# Patient Record
Sex: MALE | Race: WHITE | NOT HISPANIC OR LATINO | URBAN - METROPOLITAN AREA
[De-identification: names, ages, dates, MRNs, and addresses within clinical notes are randomized per-mention and may not be internally consistent; named-entity substitution may affect disease eponyms.]

---

## 2017-01-18 ENCOUNTER — INPATIENT (INPATIENT)
Facility: HOSPITAL | Age: 32
LOS: 1 days | Discharge: LEFT BEFORE DISCHARGE | End: 2017-01-20
Payer: COMMERCIAL

## 2017-01-18 ENCOUNTER — OUTPATIENT (OUTPATIENT)
Dept: OUTPATIENT SERVICES | Facility: HOSPITAL | Age: 32
LOS: 1 days | End: 2017-01-18

## 2017-01-18 PROCEDURE — 71020: CPT | Mod: 26

## 2017-01-18 PROCEDURE — 99291 CRITICAL CARE FIRST HOUR: CPT

## 2017-01-18 PROCEDURE — 70450 CT HEAD/BRAIN W/O DYE: CPT | Mod: 26

## 2017-01-18 PROCEDURE — 99292 CRITICAL CARE ADDL 30 MIN: CPT

## 2017-01-19 ENCOUNTER — OUTPATIENT (OUTPATIENT)
Dept: OUTPATIENT SERVICES | Facility: HOSPITAL | Age: 32
LOS: 1 days | End: 2017-01-19

## 2017-01-19 PROCEDURE — 72158 MRI LUMBAR SPINE W/O & W/DYE: CPT | Mod: 26

## 2017-01-20 ENCOUNTER — OUTPATIENT (OUTPATIENT)
Dept: OUTPATIENT SERVICES | Facility: HOSPITAL | Age: 32
LOS: 1 days | End: 2017-01-20

## 2019-06-17 ENCOUNTER — EMERGENCY (EMERGENCY)
Facility: HOSPITAL | Age: 34
LOS: 1 days | End: 2019-06-17
Admitting: EMERGENCY MEDICINE
Payer: COMMERCIAL

## 2019-06-17 PROCEDURE — 12001 RPR S/N/AX/GEN/TRNK 2.5CM/<: CPT

## 2019-06-17 PROCEDURE — 73130 X-RAY EXAM OF HAND: CPT | Mod: 26,RT

## 2019-06-17 PROCEDURE — 99283 EMERGENCY DEPT VISIT LOW MDM: CPT | Mod: 25

## 2019-07-06 ENCOUNTER — EMERGENCY (EMERGENCY)
Facility: HOSPITAL | Age: 34
LOS: 1 days | End: 2019-07-06
Admitting: EMERGENCY MEDICINE
Payer: COMMERCIAL

## 2019-07-06 PROCEDURE — 70450 CT HEAD/BRAIN W/O DYE: CPT | Mod: 26

## 2019-07-06 PROCEDURE — 99284 EMERGENCY DEPT VISIT MOD MDM: CPT

## 2019-07-09 PROCEDURE — 93010 ELECTROCARDIOGRAM REPORT: CPT

## 2021-06-17 ENCOUNTER — INPATIENT (INPATIENT)
Facility: HOSPITAL | Age: 36
LOS: 1 days | Discharge: HOME | End: 2021-06-19
Payer: COMMERCIAL

## 2021-06-17 PROCEDURE — 73562 X-RAY EXAM OF KNEE 3: CPT | Mod: 26,LT

## 2021-06-17 PROCEDURE — 73590 X-RAY EXAM OF LOWER LEG: CPT | Mod: 26,LT

## 2021-06-17 PROCEDURE — 93010 ELECTROCARDIOGRAM REPORT: CPT

## 2021-06-17 PROCEDURE — 72170 X-RAY EXAM OF PELVIS: CPT | Mod: 26

## 2021-06-17 PROCEDURE — 73706 CT ANGIO LWR EXTR W/O&W/DYE: CPT | Mod: 26,50

## 2021-06-17 PROCEDURE — 73630 X-RAY EXAM OF FOOT: CPT | Mod: 26,LT

## 2021-06-17 PROCEDURE — 71045 X-RAY EXAM CHEST 1 VIEW: CPT | Mod: 26

## 2021-06-17 PROCEDURE — 99285 EMERGENCY DEPT VISIT HI MDM: CPT

## 2021-06-17 PROCEDURE — 73610 X-RAY EXAM OF ANKLE: CPT | Mod: 26,LT

## 2021-06-29 ENCOUNTER — APPOINTMENT (OUTPATIENT)
Dept: PLASTIC SURGERY | Facility: CLINIC | Age: 36
End: 2021-06-29
Payer: COMMERCIAL

## 2021-06-29 VITALS
SYSTOLIC BLOOD PRESSURE: 140 MMHG | TEMPERATURE: 97 F | DIASTOLIC BLOOD PRESSURE: 86 MMHG | RESPIRATION RATE: 18 BRPM | HEIGHT: 70 IN | HEART RATE: 94 BPM | WEIGHT: 232 LBS | BODY MASS INDEX: 33.21 KG/M2 | OXYGEN SATURATION: 99 %

## 2021-06-29 DIAGNOSIS — Z72.89 OTHER PROBLEMS RELATED TO LIFESTYLE: ICD-10-CM

## 2021-06-29 DIAGNOSIS — Z87.891 PERSONAL HISTORY OF NICOTINE DEPENDENCE: ICD-10-CM

## 2021-06-29 DIAGNOSIS — Z87.820 PERSONAL HISTORY OF TRAUMATIC BRAIN INJURY: ICD-10-CM

## 2021-06-29 PROCEDURE — 99072 ADDL SUPL MATRL&STAF TM PHE: CPT

## 2021-06-29 PROCEDURE — 11042 DBRDMT SUBQ TIS 1ST 20SQCM/<: CPT | Mod: 59

## 2021-06-29 PROCEDURE — 99242 OFF/OP CONSLTJ NEW/EST SF 20: CPT | Mod: 25

## 2021-06-29 PROCEDURE — 27603 DRAIN LOWER LEG LESION: CPT

## 2021-06-29 RX ORDER — ALPRAZOLAM 1 MG/1
1 TABLET ORAL
Qty: 90 | Refills: 0 | Status: ACTIVE | COMMUNITY
Start: 2021-06-08

## 2021-06-29 RX ORDER — DEXTROAMPHETAMINE SACCHARATE, AMPHETAMINE ASPARTATE, DEXTROAMPHETAMINE SULFATE AND AMPHETAMINE SULFATE 3.75; 3.75; 3.75; 3.75 MG/1; MG/1; MG/1; MG/1
15 TABLET ORAL
Qty: 60 | Refills: 0 | Status: ACTIVE | COMMUNITY
Start: 2021-06-08

## 2021-06-29 RX ORDER — CICLOPIROX 10 MG/.96ML
1 SHAMPOO TOPICAL
Qty: 120 | Refills: 0 | Status: ACTIVE | COMMUNITY
Start: 2021-06-16

## 2021-06-29 RX ORDER — FLUOCINOLONE ACETONIDE 0.11 MG/ML
0.01 OIL TOPICAL
Qty: 118 | Refills: 0 | Status: ACTIVE | COMMUNITY
Start: 2021-06-16

## 2021-06-29 RX ORDER — KETOCONAZOLE 20.5 MG/ML
2 SHAMPOO, SUSPENSION TOPICAL
Qty: 120 | Refills: 0 | Status: ACTIVE | COMMUNITY
Start: 2021-06-16

## 2021-06-29 RX ORDER — ESCITALOPRAM OXALATE 10 MG/1
10 TABLET ORAL
Qty: 90 | Refills: 0 | Status: ACTIVE | COMMUNITY
Start: 2021-04-12

## 2021-06-29 RX ORDER — DEXTROAMPHETAMINE SACCHARATE, AMPHETAMINE ASPARTATE, DEXTROAMPHETAMINE SULFATE AND AMPHETAMINE SULFATE 1.25; 1.25; 1.25; 1.25 MG/1; MG/1; MG/1; MG/1
5 TABLET ORAL
Qty: 30 | Refills: 0 | Status: ACTIVE | COMMUNITY
Start: 2021-06-08

## 2021-06-29 NOTE — PHYSICAL EXAM
[NI] : Normal [de-identified] : NL respiratory effort noted  [de-identified] : Right leg normal, no edema.\par Left leg with ecchymosis and edema from the distal thigh to the foot.\par No erythema.\par Tense hematoma in the lateral compartment with overlying skin ischemia adán necrosis, about 3cm diameter.\par No drainage. No purulence.\par Flexion and extension intact at ankle and toes.\par Sensory exam not performed.
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
## 2021-06-29 NOTE — HISTORY OF PRESENT ILLNESS
[FreeTextEntry1] : This is a 35 yo man who fell from a ladder about three weeks ago.\par He was admitted to [facility] and a CT was done that showed a left calf hematoma.\par He was seen in the office by Dr. Crain who was concerned about the development of necrosis of the over lying skin and referred him for evaluation. He was also started on Amoxicillin.\par He has had gradually decreasing pain, but it still throbs when the leg is not elevated.\par Taking oxycodone APAP and ibuprofen.

## 2021-06-29 NOTE — REASON FOR VISIT
[Consultation] : a consultation visit [FreeTextEntry1] : hematoma with possible skin necrosis of left lower leg.  Patient sustained a fall from a ladder on 6/16/21.

## 2021-06-29 NOTE — ASSESSMENT
[FreeTextEntry1] : Left lower leg hematoma with skin necrosis, evacuated and debrided today.\par Pt given pain management handout, and advised to used alternating Tylenol and Motrin for baseline pain relief and oxycodone 5mg for breakthrough (will prescribe today). He just has one percocet left. He was advised not to take this with the tylenol.\par Pt was advised to finish the abx rx, but no further abx should be required.\par We discussed the natural history of this type of wound and the prolonged wound care required. He will need further skin debridement as well.\par Follow up weekly.\par Discussed with Dr. Crain as well.

## 2021-06-29 NOTE — PROCEDURE
[FreeTextEntry1] : Left lower leg hematoma with skin necrosis [FreeTextEntry2] : Evacuation of left lower leg hematoma, sharp debridement of necrotic skin 9cm2 [FreeTextEntry3] : none [FreeTextEntry4] : minimal (about 100cc of old clot, no fresh bleeding) [FreeTextEntry5] : None [FreeTextEntry6] : The left leg was prepped with betadine.\par The lateral lower leg skin was asensate, so no local was used.\par A 15 blade scalpel was used to enter the skin to the level of the SQ, just proximal to the eschar. Dark, old blood was immediately obtained.\par Scissors were used to excise the eschar. \par A large amount of old clot was then expressed.\par Within the clot, there was a "tongue" of necrotic SQ fat and superficial fascia, based inferiorly. This was excised with scissors.\par The wound was wiped out to 4x4 gauze pads to remove additional clot.\par The area was cleansed and dressed with 4x4s, ABD pads and an ace wrap.\par The pt tolerated this well. He was given dressing supplies and instructions.

## 2021-07-07 ENCOUNTER — APPOINTMENT (OUTPATIENT)
Dept: PLASTIC SURGERY | Facility: CLINIC | Age: 36
End: 2021-07-07
Payer: COMMERCIAL

## 2021-07-07 VITALS
BODY MASS INDEX: 33.7 KG/M2 | DIASTOLIC BLOOD PRESSURE: 80 MMHG | TEMPERATURE: 98 F | OXYGEN SATURATION: 97 % | WEIGHT: 235.4 LBS | HEART RATE: 97 BPM | SYSTOLIC BLOOD PRESSURE: 124 MMHG | HEIGHT: 70 IN | RESPIRATION RATE: 18 BRPM

## 2021-07-07 PROCEDURE — 99024 POSTOP FOLLOW-UP VISIT: CPT

## 2021-07-07 RX ORDER — AMOXICILLIN AND CLAVULANATE POTASSIUM 875; 125 MG/1; MG/1
875-125 TABLET, COATED ORAL
Qty: 20 | Refills: 0 | Status: DISCONTINUED | COMMUNITY
Start: 2021-06-22 | End: 2021-07-07

## 2021-07-07 RX ORDER — OXYCODONE 5 MG/1
5 TABLET ORAL
Qty: 10 | Refills: 0 | Status: COMPLETED | COMMUNITY
Start: 2021-06-29 | End: 2021-07-07

## 2021-07-07 RX ORDER — OXYCODONE 5 MG/1
5 TABLET ORAL
Refills: 0 | Status: COMPLETED | COMMUNITY
End: 2021-07-07

## 2021-07-13 ENCOUNTER — APPOINTMENT (OUTPATIENT)
Dept: PLASTIC SURGERY | Facility: CLINIC | Age: 36
End: 2021-07-13
Payer: COMMERCIAL

## 2021-07-13 VITALS
HEIGHT: 70 IN | OXYGEN SATURATION: 100 % | HEART RATE: 100 BPM | DIASTOLIC BLOOD PRESSURE: 80 MMHG | SYSTOLIC BLOOD PRESSURE: 146 MMHG | RESPIRATION RATE: 18 BRPM | WEIGHT: 234.4 LBS | TEMPERATURE: 97.3 F | BODY MASS INDEX: 33.56 KG/M2

## 2021-07-13 DIAGNOSIS — S80.12XA CONTUSION OF LEFT LOWER LEG, INITIAL ENCOUNTER: ICD-10-CM

## 2021-07-13 DIAGNOSIS — S81.802A UNSPECIFIED OPEN WOUND, LEFT LOWER LEG, INITIAL ENCOUNTER: ICD-10-CM

## 2021-07-13 PROCEDURE — 99024 POSTOP FOLLOW-UP VISIT: CPT

## 2021-07-13 NOTE — HISTORY OF PRESENT ILLNESS
[FreeTextEntry1] : Pt reports he had increased drainage from the wound one day, while he was up and around at a barbecue.\par He also had one episode of night sweats. No fevers or chills.\par He has been keeping the leg elevated most of the time.\par The drainage was a bit less this morning, but generally soaks through two ABD pads twice a day.\par

## 2021-07-13 NOTE — REVIEW OF SYSTEMS
[Fever] : no fever [Chills] : no chills [As Noted in HPI] : as noted in HPI [Negative] : Gastrointestinal [FreeTextEntry9] : Left leg is stiff when he first gets up from sitting or from bed, but then once he gets moving he walks without a limp.

## 2021-07-13 NOTE — ASSESSMENT
[FreeTextEntry1] : Gradually improving following left lower leg hematoma and necrosis of skin and SQ fat.\par Continue wound care and elevation.\par Follow up next week. After that, we may be able to go to every 2 weeks.\par Will discuss return to works next week. \par For now he should elevate the leg and not be working/traveling to work.
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
## 2021-07-21 ENCOUNTER — APPOINTMENT (OUTPATIENT)
Dept: PLASTIC SURGERY | Facility: CLINIC | Age: 36
End: 2021-07-21
Payer: COMMERCIAL

## 2021-07-21 VITALS
HEIGHT: 70 IN | DIASTOLIC BLOOD PRESSURE: 90 MMHG | SYSTOLIC BLOOD PRESSURE: 140 MMHG | HEART RATE: 88 BPM | RESPIRATION RATE: 16 BRPM | BODY MASS INDEX: 33.64 KG/M2 | TEMPERATURE: 97.2 F | WEIGHT: 235 LBS | OXYGEN SATURATION: 98 %

## 2021-07-21 PROCEDURE — 99024 POSTOP FOLLOW-UP VISIT: CPT

## 2021-08-02 ENCOUNTER — NON-APPOINTMENT (OUTPATIENT)
Age: 36
End: 2021-08-02

## 2021-08-04 ENCOUNTER — NON-APPOINTMENT (OUTPATIENT)
Age: 36
End: 2021-08-04

## 2021-08-04 ENCOUNTER — APPOINTMENT (OUTPATIENT)
Dept: PLASTIC SURGERY | Facility: CLINIC | Age: 36
End: 2021-08-04
Payer: COMMERCIAL

## 2021-08-04 VITALS
WEIGHT: 229.4 LBS | OXYGEN SATURATION: 98 % | HEART RATE: 92 BPM | DIASTOLIC BLOOD PRESSURE: 86 MMHG | BODY MASS INDEX: 32.84 KG/M2 | SYSTOLIC BLOOD PRESSURE: 142 MMHG | RESPIRATION RATE: 18 BRPM | HEIGHT: 70 IN | TEMPERATURE: 98.2 F

## 2021-08-04 PROCEDURE — 99024 POSTOP FOLLOW-UP VISIT: CPT

## 2021-08-10 ENCOUNTER — APPOINTMENT (OUTPATIENT)
Dept: PLASTIC SURGERY | Facility: CLINIC | Age: 36
End: 2021-08-10
Payer: COMMERCIAL

## 2021-08-10 VITALS
TEMPERATURE: 96.6 F | SYSTOLIC BLOOD PRESSURE: 142 MMHG | BODY MASS INDEX: 32.93 KG/M2 | WEIGHT: 230 LBS | OXYGEN SATURATION: 99 % | HEART RATE: 96 BPM | RESPIRATION RATE: 18 BRPM | DIASTOLIC BLOOD PRESSURE: 78 MMHG | HEIGHT: 70 IN

## 2021-08-10 DIAGNOSIS — S80.12XD CONTUSION OF LEFT LOWER LEG, SUBSEQUENT ENCOUNTER: ICD-10-CM

## 2021-08-10 PROCEDURE — 99024 POSTOP FOLLOW-UP VISIT: CPT

## 2021-08-10 RX ORDER — HYDROCORTISONE 25 MG/ML
2.5 LOTION TOPICAL
Qty: 59 | Refills: 0 | Status: DISCONTINUED | COMMUNITY
Start: 2021-06-16 | End: 2021-08-10

## 2021-08-10 RX ORDER — SILVER SULFADIAZINE 10 MG/G
1 CREAM TOPICAL
Qty: 20 | Refills: 0 | Status: DISCONTINUED | COMMUNITY
Start: 2021-06-19 | End: 2021-08-10

## 2021-08-10 RX ORDER — ACETAMINOPHEN 325 MG/1
TABLET, FILM COATED ORAL
Refills: 0 | Status: ACTIVE | COMMUNITY

## 2021-08-10 RX ORDER — IBUPROFEN 800 MG/1
800 TABLET, FILM COATED ORAL
Refills: 0 | Status: DISCONTINUED | COMMUNITY
End: 2021-08-10

## 2021-08-31 ENCOUNTER — APPOINTMENT (OUTPATIENT)
Dept: PLASTIC SURGERY | Facility: CLINIC | Age: 36
End: 2021-08-31
Payer: COMMERCIAL

## 2021-08-31 VITALS
RESPIRATION RATE: 18 BRPM | OXYGEN SATURATION: 98 % | WEIGHT: 230 LBS | HEART RATE: 105 BPM | HEIGHT: 70 IN | DIASTOLIC BLOOD PRESSURE: 78 MMHG | BODY MASS INDEX: 32.93 KG/M2 | TEMPERATURE: 98 F | SYSTOLIC BLOOD PRESSURE: 142 MMHG

## 2021-08-31 PROCEDURE — 99024 POSTOP FOLLOW-UP VISIT: CPT

## 2021-09-22 ENCOUNTER — APPOINTMENT (OUTPATIENT)
Dept: PLASTIC SURGERY | Facility: CLINIC | Age: 36
End: 2021-09-22
Payer: COMMERCIAL

## 2021-09-22 VITALS
SYSTOLIC BLOOD PRESSURE: 128 MMHG | HEART RATE: 102 BPM | DIASTOLIC BLOOD PRESSURE: 84 MMHG | HEIGHT: 70 IN | OXYGEN SATURATION: 99 % | RESPIRATION RATE: 18 BRPM | BODY MASS INDEX: 33.89 KG/M2 | WEIGHT: 236.7 LBS | TEMPERATURE: 97 F

## 2021-09-22 PROCEDURE — 99024 POSTOP FOLLOW-UP VISIT: CPT

## 2021-09-23 NOTE — ASSESSMENT
[FreeTextEntry1] : Doing well with current treatment.\par Try to avoid tape on irritated areas. Consider antifungal cream if it doesn't improve (pt given written examples for OTC creams).\par Follow up in three weeks to assess progress.

## 2021-09-23 NOTE — REASON FOR VISIT
[Follow-Up: _____] : a [unfilled] follow-up visit [FreeTextEntry1] : evacuation of hematoma s/p LT leg injury sustained after falling from a ladder at home on 6/16/21.  Patient reports three areas above the site that are "soft" and are starting to feel like the original wound.

## 2021-09-23 NOTE — HISTORY OF PRESENT ILLNESS
[FreeTextEntry1] : Pt reports he as been using telfa and antibiotic ointment and changing the orientation of the tape each time.\par He tried using a sock, but it kept coming down.\par Otherwise feeling well

## 2021-09-23 NOTE — ASSESSMENT
[FreeTextEntry1] : Right lower leg wound with significant improvement.\par Continue daily or BID dressings with xeroform or abx ointment and absorbant pads.\par Continue showers. No tub baths or swimming.\par If the wound gets wet at the beach, or stephan, wash off in the shower as soon as feasible.\par follow up in 2-3 weeks.

## 2021-09-23 NOTE — PHYSICAL EXAM
[NI] : Normal [de-identified] : NL respiratory effort noted  [de-identified] : Left lower leg with mild edema at the ankle.\par Focal edema and induration around the wound in the undermined area.\par Multiple areas probed with no evidence of abscess, but some remaining fat necrosis.\par The surrounding skin had healed.\par Increased granulation at the base.

## 2021-09-23 NOTE — HISTORY OF PRESENT ILLNESS
[FreeTextEntry1] : Pt reports the top part of the hematoma spontaneously drained through the skin and through the open wound (see chart note).\par No fevers or chills.\par Otherwise feeling well.\par Doing dressing changes himself.

## 2021-09-23 NOTE — REASON FOR VISIT
[Follow-Up: _____] : a [unfilled] follow-up visit [FreeTextEntry1] : evacuation of hematoma s/p leg injury sustained after falling from a ladder at home on 6/16/21.  Patient reports improvement since last visit.

## 2021-09-23 NOTE — REASON FOR VISIT
[Follow-Up: _____] : a [unfilled] follow-up visit [FreeTextEntry1] : follow up s/p LT leg injury sustained after falling off of a ladder at home on 6/16/21.  Patient is feeling well today and reports the wound is decreasing in size.

## 2021-09-23 NOTE — PHYSICAL EXAM
[NI] : Normal [de-identified] : NL respiratory effort noted  [de-identified] : Left lower leg with moderate edema.\par Wound with dark SS drainage.\par Some necrotic skin debrided with scissors along with some necrotic SQ fat.\par The inferior skin was opened with scissors about 3cm to access the fat.\par A new bulky dressing was placed.

## 2021-09-23 NOTE — PHYSICAL EXAM
[NI] : Normal [de-identified] : NL respiratory effort noted  [de-identified] : Left lower leg with decreased edema throughout\par No erythema.\par Lateral wound has increased granulation and decreased depth related to the surrounding skin.\par Proximal wounds from the recent spontaneous drainage are small (3-4mm) except the one closer to the main wound which is about 1.5cm.\par There is serosanguinous drainage but no pus.

## 2021-09-23 NOTE — REASON FOR VISIT
[Follow-Up: _____] : a [unfilled] follow-up visit [FreeTextEntry1] : pt following up on l leg injury after falling off ladder

## 2021-09-23 NOTE — HISTORY OF PRESENT ILLNESS
[FreeTextEntry1] : Pt reports he has seen quite a bit of improvement since his last visit.\par He is no longer using the diaper/pull-up pad, just a telfa with tape during the day and leaving all bandages off at night.\par He has been back to work for about three weeks and doing well.

## 2021-09-23 NOTE — HISTORY OF PRESENT ILLNESS
[FreeTextEntry1] : Pt reports he has been having drainage from the wound, and recently had a clot come out.\par No fevers or chills.\par Changing his dressings himself.\par Doing well with this.\par Still some pain, pete when the leg is down.

## 2021-09-23 NOTE — HISTORY OF PRESENT ILLNESS
[FreeTextEntry1] : Feeling better.\par Still putting his foot up when he can, but is also taking care of twin 4 year-olds at home.\par Trying to figure out when it will be safe to go back to work. 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 Has been using a cut half of a pull-up to absorb the drainage.

## 2021-09-23 NOTE — ASSESSMENT
[FreeTextEntry1] : Left lower leg wound due to skin necrosis after traumatic hematoma.\par Continue local wound care and weekly visits until all necrotic material is gone.\par Continue elevation.\par Follow up next week.

## 2021-09-23 NOTE — HISTORY OF PRESENT ILLNESS
[FreeTextEntry1] : Pt reports the top part of the hematoma and the wound are draining less. Discomfort and swelling are down and he is ready to go back to work.\par No fevers or chills.\par Otherwise feeling well.\par Doing dressing changes himself BID.

## 2021-09-23 NOTE — ASSESSMENT
[FreeTextEntry1] : Left lower leg wound, gradually improving.\par I recommend continued elevation and dressing changes. These can probably be reduced to 2-3 times per day, depending on drainage. I advised alternating gauze pads with the pullups to avoid too much moisture or a candidal rash.\par I also recommended taking a trial run commuting into the city to see how this affects his swelling before committing to going back to work.\par Follow up in two weeks.

## 2021-09-23 NOTE — REASON FOR VISIT
[Follow-Up: _____] : a [unfilled] follow-up visit [FreeTextEntry1] : evacuation of hematoma s/p LT leg injury sustained after falling from a ladder at home on 6/16/21.  Patient reports there is still some bleeding from the site.  Removed a large clot from wound this afternoon.

## 2021-09-23 NOTE — PHYSICAL EXAM
[NI] : Normal [de-identified] : NL respiratory effort noted  [de-identified] : Left lower leg with no edema.\par There are two erythematous, scaly patches distally that could be healing tape excoriation or resolving fungal infection.\par The wound itself has a base of beefy granulation and a rim of epithelialization.\par The open area measures 4.5 by 2.5cm.

## 2021-09-23 NOTE — ASSESSMENT
[FreeTextEntry1] : Wound gradually improving but proximal area drained spontaneously once the egress from the main wound started to heal and seal off.\par Continue showers and wound care. Wipe the wound and skin around it to avoid crusting and drying of the skin.\par Begin moist wound care proximally with xeroform or antibiotic ointment. This will allow continued spontaneous drainage.\par Call for any redness, warmth or new swelling as well as fever of chills.\par Follow up in one week.

## 2021-09-23 NOTE — ASSESSMENT
[FreeTextEntry1] : Left lower leg wound, improving.\par Do not let it dry out overnight as this will dessicate the wound as it is healing.\par Recommend moist wound healing with xeroform or abx ointment on 4x4 and light wrap.\par Minimize tape on skin. Can use knee high socks to hold up bandages as needed.\par Follow up in 3 weeks.

## 2021-09-23 NOTE — REASON FOR VISIT
[Follow-Up: _____] : a [unfilled] follow-up visit [FreeTextEntry1] : for evacuation of hematoma s/p LT leg injury sustained after falling from a ladder at home on 6/16/21.  Patient reports improvement at area of concern above original wound.

## 2021-09-23 NOTE — PHYSICAL EXAM
[NI] : Normal [de-identified] : Forehead sweating noted. [de-identified] : NL respiratory effort noted  [de-identified] : Left lower leg wound has significantly improved in terms of contour, with the granulation now at the level of the skin.\par There is some new epithelium visible laterally, but the wound base is slightly dessicated.\par The three punctate openings proximally are improved, with two nearly closed. The third area is open less than 1cm and has some fat necrosis at the center.\par No erythema. Decreased edema.\par Distal skin is dry and scaled as though from recent healing from tape or moisture.

## 2021-09-23 NOTE — PHYSICAL EXAM
[NI] : Normal [de-identified] : NL respiratory effort noted  [de-identified] : Left lower leg wound with significant increase in granulation at the base and decrease in surrounding edema.\par There are three new areas of pointing proximal to the wound.\par They are crusted but soft with no areas of pus under pressure.\par A swab was passed from the wound into the closest area and no fluid was released.\par A warm, wet compress was applied for 5-7 minutes and the crusted dry skin was wiped away.\par Each area has a small opening, but no purulence.\par No surrounding erythema. There is some residual induration, but decreased from previous.\par Ankle and foot with no edema.

## 2021-11-16 ENCOUNTER — APPOINTMENT (OUTPATIENT)
Dept: PLASTIC SURGERY | Facility: CLINIC | Age: 36
End: 2021-11-16
Payer: COMMERCIAL

## 2021-11-16 VITALS
TEMPERATURE: 97.6 F | BODY MASS INDEX: 34.07 KG/M2 | HEIGHT: 70 IN | DIASTOLIC BLOOD PRESSURE: 90 MMHG | SYSTOLIC BLOOD PRESSURE: 122 MMHG | HEART RATE: 88 BPM | WEIGHT: 238 LBS | OXYGEN SATURATION: 99 % | RESPIRATION RATE: 14 BRPM

## 2021-11-16 DIAGNOSIS — S81.802D UNSPECIFIED OPEN WOUND, LEFT LOWER LEG, SUBSEQUENT ENCOUNTER: ICD-10-CM

## 2021-11-16 PROCEDURE — 99213 OFFICE O/P EST LOW 20 MIN: CPT

## 2021-11-16 NOTE — PHYSICAL EXAM
[NI] : Normal [de-identified] : NL respiratory effort noted  [de-identified] : Left lower leg wound mostly epithelialized and sofia well.\par The entire scar is now much smaller than the original wound.\par There is still a tiny area of scab and flaking skin. \par Minimal edema.\par No erythema.
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
## 2021-11-16 NOTE — ASSESSMENT
[FreeTextEntry1] : Doing very well.\par Continue ointment until no more scab or crust, then moisturize 2-3 times a day.\par I reviewed scar care as follows:\par \par Scars tend to heal fairly quickly (longer in his case due to healing by secondary intention), then mature slowly over time. The vast majority of improvement occurs in the first 2-3 months, with the pink/red scar becoming softer, more pliable and lighter in color (depending upon skin tone). At 6 months, the scar appears lighter and at approximately one year, the scar reaches maximum improvement (with a range from 9-18 months).\par Scar care should include skin moisturizer, sunscreen, and if desired, silicone gel. \par Silicone gel use should begin after all scabs and crust are gone and the scars are smooth and dry, usually about 3 weeks. The silicone gel should be used twice a day for a treatment course of three months to achieve benefit. Sunscreen and/or makeup can be applied after the silicone gel has been applied and allowed to dry.\par Follow up as needed.\par Call for any questions or concerns. \par \par

## 2022-12-08 NOTE — REVIEW OF SYSTEMS
Patient achieved 1500 Ml/sec on IS. Patient encouraged to do every hour while awake-patient agreed and demonstrated. No shortness of breath or distress noted. Continuous sat ordered HS and placed at this time. [As Noted in HPI] : as noted in HPI [Negative] : Respiratory

## 2023-07-17 ENCOUNTER — APPOINTMENT (OUTPATIENT)
Dept: FAMILY MEDICINE | Facility: CLINIC | Age: 38
End: 2023-07-17
Payer: COMMERCIAL

## 2023-07-17 ENCOUNTER — NON-APPOINTMENT (OUTPATIENT)
Age: 38
End: 2023-07-17

## 2023-07-17 VITALS
RESPIRATION RATE: 14 BRPM | TEMPERATURE: 102 F | HEART RATE: 98 BPM | WEIGHT: 251 LBS | BODY MASS INDEX: 35.93 KG/M2 | OXYGEN SATURATION: 98 % | HEIGHT: 70 IN | SYSTOLIC BLOOD PRESSURE: 160 MMHG | DIASTOLIC BLOOD PRESSURE: 110 MMHG

## 2023-07-17 DIAGNOSIS — S46.009A UNSPECIFIED INJURY OF MUSCLE(S) AND TENDON(S) OF THE ROTATOR CUFF OF UNSPECIFIED SHOULDER, INITIAL ENCOUNTER: ICD-10-CM

## 2023-07-17 DIAGNOSIS — I96 GANGRENE, NOT ELSEWHERE CLASSIFIED: ICD-10-CM

## 2023-07-17 DIAGNOSIS — F98.8 OTHER SPECIFIED BEHAVIORAL AND EMOTIONAL DISORDERS WITH ONSET USUALLY OCCURRING IN CHILDHOOD AND ADOLESCENCE: ICD-10-CM

## 2023-07-17 DIAGNOSIS — F41.9 ANXIETY DISORDER, UNSPECIFIED: ICD-10-CM

## 2023-07-17 PROCEDURE — 99204 OFFICE O/P NEW MOD 45 MIN: CPT

## 2023-07-26 PROBLEM — I96 SKIN NECROSIS: Status: ACTIVE | Noted: 2021-06-29

## 2023-07-26 NOTE — HEALTH RISK ASSESSMENT
[0] : 2) Feeling down, depressed, or hopeless: Not at all (0) [PHQ-2 Negative - No further assessment needed] : PHQ-2 Negative - No further assessment needed [[id number]] : 0

## 2023-07-26 NOTE — PLAN
[FreeTextEntry1] : 38-year-old gentleman presents to establish health care at this facility\par \par Traumatic brain injury–history of remote traumatic brain injury able to function well\par Rotator cuff injury–dealing with rotator cuff injury following with orthopedics\par ADD–controlled with Adderall\par Hypertension–160/110\par Had not been taking medication as prescribed\par Started on labetalol 300 mg initially once daily and will go to twice daily as needed he is asked to be in compliance\par Wound left leg with necrosis–follows with plastics

## 2023-07-26 NOTE — HISTORY OF PRESENT ILLNESS
[FreeTextEntry1] : new  [de-identified] : bp \par shoulder rot cuff \par [place]   \par tob -  etoh beer  six \par fish \par wife   6 twin \par

## 2023-11-02 NOTE — PHYSICAL EXAM
Identified Patient with two Patient identifiers(name and ). 1. Have you been to the ER, urgent care clinic since your last visit? Hospitalized since your last visit? No    2. Have you seen or consulted any other health care providers outside of the [address] [place] [address] since your last visit? No     3. For patients aged 43-73: Has the patient had a colonoscopy / FIT/ Cologuard? No    If the patient is female:    4. For patients aged 43-66: Has the patient had a mammogram within the past 2 years? No      5. For patients aged 21-65: Has the patient had a pap smear? No   There were no vitals taken for this visit. Chief Complaint   Patient presents with    Medicare AWV     Needs Rx r/f. No acute issues       Health Maintenance Due   Topic Date Due    Shingles vaccine (1 of 2) Never done    DTaP/Tdap/Td vaccine (1 - Tdap) 2008    Pneumococcal 65+ years Vaccine (2 - PCV) 2013    COVID-19 Vaccine (3 - Moderna series) 2021    Lipids  2022    Annual Wellness Visit (AWV)  2023    Depression Screen  2023    Flu vaccine (1) 2023          No questionnaires available. [NI] : Normal [de-identified] : NL respiratory effort noted\par  [de-identified] : Left lower leg with moderate edema, especially laterally.\par No erythema.\par dark serosanguinous fluid from wound.\par Minimal skin necrosis remaining. This was debrided with scissors.\par Minimal remaining organized clot removed with forceps and tissues bluntly debrided with 4x4.\par Significant increase in granulation tissue at the base of the wound.\par No purulence.

## 2024-02-05 ENCOUNTER — APPOINTMENT (OUTPATIENT)
Dept: FAMILY MEDICINE | Facility: CLINIC | Age: 39
End: 2024-02-05
Payer: COMMERCIAL

## 2024-02-05 VITALS
SYSTOLIC BLOOD PRESSURE: 160 MMHG | OXYGEN SATURATION: 97 % | WEIGHT: 246 LBS | TEMPERATURE: 97.7 F | HEIGHT: 70 IN | HEART RATE: 113 BPM | RESPIRATION RATE: 16 BRPM | DIASTOLIC BLOOD PRESSURE: 110 MMHG | BODY MASS INDEX: 35.22 KG/M2

## 2024-02-05 DIAGNOSIS — Z13.228 ENCOUNTER FOR SCREENING FOR OTHER METABOLIC DISORDERS: ICD-10-CM

## 2024-02-05 DIAGNOSIS — Z13.21 ENCOUNTER FOR SCREENING FOR NUTRITIONAL DISORDER: ICD-10-CM

## 2024-02-05 DIAGNOSIS — I10 ESSENTIAL (PRIMARY) HYPERTENSION: ICD-10-CM

## 2024-02-05 DIAGNOSIS — L21.9 SEBORRHEIC DERMATITIS, UNSPECIFIED: ICD-10-CM

## 2024-02-05 DIAGNOSIS — Z13.1 ENCOUNTER FOR SCREENING FOR DIABETES MELLITUS: ICD-10-CM

## 2024-02-05 DIAGNOSIS — Z13.220 ENCOUNTER FOR SCREENING FOR LIPOID DISORDERS: ICD-10-CM

## 2024-02-05 DIAGNOSIS — Z13.6 ENCOUNTER FOR SCREENING FOR LIPOID DISORDERS: ICD-10-CM

## 2024-02-05 PROCEDURE — 99214 OFFICE O/P EST MOD 30 MIN: CPT

## 2024-02-05 NOTE — PHYSICAL EXAM
[Regular Rhythm] : with a regular rhythm [No Murmur] : no murmur heard [Normal] : affect was normal and insight and judgment were intact [Normal S1, S2] : normal S1 and S2

## 2024-02-05 NOTE — PLAN
[FreeTextEntry1] : Seborrheic dermatitis- prescribed ketoconazole foam BID for 4 weeks. If symptoms continue patient will follow up with his dermatologist. Denies needing referral HTN- has not taken BP meds in a few days. /110. Restart losartan 300mg, check BP once daily and send in bp log in 2-3 weeks.  Fasting labs ordered.  Follow up in 3 months

## 2024-02-05 NOTE — HISTORY OF PRESENT ILLNESS
[FreeTextEntry1] : med refills [de-identified] : Patient presents for med refills. He ran out of medication a few days ago. He denies chest pain, SOB, blurry vision or headaches.

## 2024-02-08 RX ORDER — KETOCONAZOLE FOAM 20 MG/G
2 AEROSOL, FOAM TOPICAL TWICE DAILY
Qty: 1 | Refills: 1 | Status: ACTIVE | COMMUNITY
Start: 2024-02-05 | End: 1900-01-01

## 2024-02-27 ENCOUNTER — APPOINTMENT (OUTPATIENT)
Dept: MRI IMAGING | Facility: CLINIC | Age: 39
End: 2024-02-27
Payer: COMMERCIAL

## 2024-02-27 PROCEDURE — 73221 MRI JOINT UPR EXTREM W/O DYE: CPT | Mod: LT

## 2024-04-29 ENCOUNTER — RX RENEWAL (OUTPATIENT)
Age: 39
End: 2024-04-29

## 2024-04-29 RX ORDER — LABETALOL HYDROCHLORIDE 300 MG/1
300 TABLET, FILM COATED ORAL
Qty: 180 | Refills: 0 | Status: ACTIVE | COMMUNITY
Start: 2023-07-17 | End: 1900-01-01

## 2024-07-22 ENCOUNTER — APPOINTMENT (OUTPATIENT)
Dept: FAMILY MEDICINE | Facility: CLINIC | Age: 39
End: 2024-07-22

## 2024-11-21 ENCOUNTER — APPOINTMENT (OUTPATIENT)
Dept: FAMILY MEDICINE | Facility: CLINIC | Age: 39
End: 2024-11-21
Payer: COMMERCIAL

## 2024-11-21 VITALS
HEIGHT: 70 IN | DIASTOLIC BLOOD PRESSURE: 80 MMHG | WEIGHT: 246 LBS | BODY MASS INDEX: 35.22 KG/M2 | OXYGEN SATURATION: 98 % | RESPIRATION RATE: 12 BRPM | SYSTOLIC BLOOD PRESSURE: 120 MMHG | HEART RATE: 76 BPM | TEMPERATURE: 97.8 F

## 2024-11-21 DIAGNOSIS — Z13.1 ENCOUNTER FOR SCREENING FOR DIABETES MELLITUS: ICD-10-CM

## 2024-11-21 DIAGNOSIS — I10 ESSENTIAL (PRIMARY) HYPERTENSION: ICD-10-CM

## 2024-11-21 DIAGNOSIS — Z13.21 ENCOUNTER FOR SCREENING FOR NUTRITIONAL DISORDER: ICD-10-CM

## 2024-11-21 PROCEDURE — 99213 OFFICE O/P EST LOW 20 MIN: CPT

## 2024-12-04 ENCOUNTER — LABORATORY RESULT (OUTPATIENT)
Age: 39
End: 2024-12-04

## 2024-12-04 ENCOUNTER — APPOINTMENT (OUTPATIENT)
Dept: FAMILY MEDICINE | Facility: CLINIC | Age: 39
End: 2024-12-04
Payer: COMMERCIAL

## 2024-12-04 PROCEDURE — 36415 COLL VENOUS BLD VENIPUNCTURE: CPT

## 2025-01-22 NOTE — HEALTH RISK ASSESSMENT
Goal Outcome Evaluation: Patient being discharged home today                                              [4 or more  times a week (4 pts)] : 4 or more  times a week (4 points) [3 or 4 (1 pt)] : 3 or 4  (1 point) [No] : In the past 12 months have you used drugs other than those required for medical reasons? No [No falls in past year] : Patient reported no falls in the past year [0] : 2) Feeling down, depressed, or hopeless: Not at all (0) [PHQ-2 Negative - No further assessment needed] : PHQ-2 Negative - No further assessment needed [de-identified] : not currently [[id number]] : 0